# Patient Record
Sex: MALE | Race: WHITE | NOT HISPANIC OR LATINO | URBAN - METROPOLITAN AREA
[De-identification: names, ages, dates, MRNs, and addresses within clinical notes are randomized per-mention and may not be internally consistent; named-entity substitution may affect disease eponyms.]

---

## 2019-09-06 ENCOUNTER — EMERGENCY (EMERGENCY)
Facility: HOSPITAL | Age: 49
LOS: 1 days | Discharge: HOME | End: 2019-09-06
Attending: EMERGENCY MEDICINE | Admitting: EMERGENCY MEDICINE
Payer: COMMERCIAL

## 2019-09-06 VITALS
OXYGEN SATURATION: 100 % | HEART RATE: 89 BPM | RESPIRATION RATE: 16 BRPM | SYSTOLIC BLOOD PRESSURE: 158 MMHG | TEMPERATURE: 99 F | HEIGHT: 69 IN | WEIGHT: 182.98 LBS | DIASTOLIC BLOOD PRESSURE: 101 MMHG

## 2019-09-06 VITALS
RESPIRATION RATE: 16 BRPM | DIASTOLIC BLOOD PRESSURE: 96 MMHG | SYSTOLIC BLOOD PRESSURE: 140 MMHG | HEART RATE: 96 BPM | TEMPERATURE: 98 F | OXYGEN SATURATION: 97 %

## 2019-09-06 DIAGNOSIS — Z98.890 OTHER SPECIFIED POSTPROCEDURAL STATES: Chronic | ICD-10-CM

## 2019-09-06 DIAGNOSIS — F32.1 MAJOR DEPRESSIVE DISORDER, SINGLE EPISODE, MODERATE: ICD-10-CM

## 2019-09-06 DIAGNOSIS — F41.0 PANIC DISORDER [EPISODIC PAROXYSMAL ANXIETY]: ICD-10-CM

## 2019-09-06 LAB
ALBUMIN SERPL ELPH-MCNC: 4.4 G/DL — SIGNIFICANT CHANGE UP (ref 3.3–5)
ALP SERPL-CCNC: 72 U/L — SIGNIFICANT CHANGE UP (ref 30–120)
ALT FLD-CCNC: 83 U/L DA — HIGH (ref 10–60)
AMPHET UR-MCNC: NEGATIVE — SIGNIFICANT CHANGE UP
ANION GAP SERPL CALC-SCNC: 12 MMOL/L — SIGNIFICANT CHANGE UP (ref 5–17)
APAP SERPL-MCNC: <1 UG/ML — LOW (ref 10–30)
AST SERPL-CCNC: 39 U/L — SIGNIFICANT CHANGE UP (ref 10–40)
BARBITURATES UR SCN-MCNC: NEGATIVE — SIGNIFICANT CHANGE UP
BASOPHILS # BLD AUTO: 0.04 K/UL — SIGNIFICANT CHANGE UP (ref 0–0.2)
BASOPHILS NFR BLD AUTO: 0.3 % — SIGNIFICANT CHANGE UP (ref 0–2)
BENZODIAZ UR-MCNC: NEGATIVE — SIGNIFICANT CHANGE UP
BILIRUB SERPL-MCNC: 1.1 MG/DL — SIGNIFICANT CHANGE UP (ref 0.2–1.2)
BUN SERPL-MCNC: 14 MG/DL — SIGNIFICANT CHANGE UP (ref 7–23)
CALCIUM SERPL-MCNC: 10.3 MG/DL — SIGNIFICANT CHANGE UP (ref 8.4–10.5)
CHLORIDE SERPL-SCNC: 101 MMOL/L — SIGNIFICANT CHANGE UP (ref 96–108)
CO2 SERPL-SCNC: 23 MMOL/L — SIGNIFICANT CHANGE UP (ref 22–31)
COCAINE METAB.OTHER UR-MCNC: NEGATIVE — SIGNIFICANT CHANGE UP
CREAT SERPL-MCNC: 0.81 MG/DL — SIGNIFICANT CHANGE UP (ref 0.5–1.3)
EOSINOPHIL # BLD AUTO: 0.02 K/UL — SIGNIFICANT CHANGE UP (ref 0–0.5)
EOSINOPHIL NFR BLD AUTO: 0.2 % — SIGNIFICANT CHANGE UP (ref 0–6)
ETHANOL SERPL-MCNC: <3 MG/DL — SIGNIFICANT CHANGE UP (ref 0–3)
GLUCOSE SERPL-MCNC: 111 MG/DL — HIGH (ref 70–99)
HCT VFR BLD CALC: 45.1 % — SIGNIFICANT CHANGE UP (ref 39–50)
HGB BLD-MCNC: 16 G/DL — SIGNIFICANT CHANGE UP (ref 13–17)
IMM GRANULOCYTES NFR BLD AUTO: 0.3 % — SIGNIFICANT CHANGE UP (ref 0–1.5)
LYMPHOCYTES # BLD AUTO: 1.38 K/UL — SIGNIFICANT CHANGE UP (ref 1–3.3)
LYMPHOCYTES # BLD AUTO: 10.7 % — LOW (ref 13–44)
MCHC RBC-ENTMCNC: 30.5 PG — SIGNIFICANT CHANGE UP (ref 27–34)
MCHC RBC-ENTMCNC: 35.5 GM/DL — SIGNIFICANT CHANGE UP (ref 32–36)
MCV RBC AUTO: 85.9 FL — SIGNIFICANT CHANGE UP (ref 80–100)
METHADONE UR-MCNC: NEGATIVE — SIGNIFICANT CHANGE UP
MONOCYTES # BLD AUTO: 0.79 K/UL — SIGNIFICANT CHANGE UP (ref 0–0.9)
MONOCYTES NFR BLD AUTO: 6.1 % — SIGNIFICANT CHANGE UP (ref 2–14)
NEUTROPHILS # BLD AUTO: 10.61 K/UL — HIGH (ref 1.8–7.4)
NEUTROPHILS NFR BLD AUTO: 82.4 % — HIGH (ref 43–77)
NRBC # BLD: 0 /100 WBCS — SIGNIFICANT CHANGE UP (ref 0–0)
OPIATES UR-MCNC: NEGATIVE — SIGNIFICANT CHANGE UP
PCP SPEC-MCNC: SIGNIFICANT CHANGE UP
PCP UR-MCNC: NEGATIVE — SIGNIFICANT CHANGE UP
PLATELET # BLD AUTO: 450 K/UL — HIGH (ref 150–400)
POTASSIUM SERPL-MCNC: 3.8 MMOL/L — SIGNIFICANT CHANGE UP (ref 3.5–5.3)
POTASSIUM SERPL-SCNC: 3.8 MMOL/L — SIGNIFICANT CHANGE UP (ref 3.5–5.3)
PROT SERPL-MCNC: 8.3 G/DL — SIGNIFICANT CHANGE UP (ref 6–8.3)
RBC # BLD: 5.25 M/UL — SIGNIFICANT CHANGE UP (ref 4.2–5.8)
RBC # FLD: 12 % — SIGNIFICANT CHANGE UP (ref 10.3–14.5)
SALICYLATES SERPL-MCNC: <3 MG/DL — SIGNIFICANT CHANGE UP (ref 2.8–20)
SODIUM SERPL-SCNC: 136 MMOL/L — SIGNIFICANT CHANGE UP (ref 135–145)
THC UR QL: NEGATIVE — SIGNIFICANT CHANGE UP
WBC # BLD: 12.88 K/UL — HIGH (ref 3.8–10.5)
WBC # FLD AUTO: 12.88 K/UL — HIGH (ref 3.8–10.5)

## 2019-09-06 PROCEDURE — 99285 EMERGENCY DEPT VISIT HI MDM: CPT

## 2019-09-06 PROCEDURE — 71046 X-RAY EXAM CHEST 2 VIEWS: CPT | Mod: 26

## 2019-09-06 PROCEDURE — 99284 EMERGENCY DEPT VISIT MOD MDM: CPT | Mod: 25

## 2019-09-06 PROCEDURE — 36415 COLL VENOUS BLD VENIPUNCTURE: CPT

## 2019-09-06 PROCEDURE — 70470 CT HEAD/BRAIN W/O & W/DYE: CPT | Mod: 26

## 2019-09-06 PROCEDURE — 90792 PSYCH DIAG EVAL W/MED SRVCS: CPT | Mod: GT

## 2019-09-06 PROCEDURE — 85027 COMPLETE CBC AUTOMATED: CPT

## 2019-09-06 PROCEDURE — 80307 DRUG TEST PRSMV CHEM ANLYZR: CPT

## 2019-09-06 PROCEDURE — 70450 CT HEAD/BRAIN W/O DYE: CPT

## 2019-09-06 PROCEDURE — 70460 CT HEAD/BRAIN W/DYE: CPT

## 2019-09-06 PROCEDURE — 80053 COMPREHEN METABOLIC PANEL: CPT

## 2019-09-06 PROCEDURE — 71046 X-RAY EXAM CHEST 2 VIEWS: CPT

## 2019-09-06 NOTE — ED BEHAVIORAL HEALTH ASSESSMENT NOTE - DIFFERENTIAL
adjust ment d/o  Additional Suicide Risk Factors (select all that apply)  [  ]Access to lethal means including firearms  [  ]Family history of suicide  [  ]Impulsivity  [  ] Current or past mood disorder  [  ] Current or past psychotic disorder  [  ] Current or past PTSD  [  ] Current or past ADHD  [  ] Current or past TBI  [  ] Current or past cluster B personality disorder or traits  [  ] Current or past conduct problems  [  ] Recent onset of current or past psychiatric disorder  [  ] Family history of psychiatric diagnoses requiring hospitalization     Additional Activating Events (select all that apply)  [  ]Perceived burden on family or others  [  ]Current sexual or physical abuse  [  ]Substance intoxication or withdrawal  [  ]Inadequate social supports  [  ]Hopeless about or dissatisfied with current provider or treatment     Additional Protective Factors (select all that apply)  [ x ] Future plans  [  ] Shinto beliefs  [  ] Beloved pets  C-SSRS Screener     1. Have you ever wished to be dead or wished you could go to sleep and not wake up?  [  ]Yes, [ x ]No, [  ]Unable to Assess     2. Have you actually had any thoughts of killing yourself?   [  ]Yes, [ x ]No, [  ]Unable to Assess     If answer is “No” for 1 and 2, stop here. If answer is “Yes” to 1 or 2, proceed to 3.     3. Have you been thinking about how you might kill yourself?  [  ]Yes, [  ]No, [  ]Unable to Assess     4. Have you had these thoughts and had some intention of acting on them?  [  ]Yes, [  ]No, [  ]Unable to Assess     5. Have you started to work out or worked out the details of how to kill yourself? Do you intend to carry out this plan?  [  ]Yes, [  ]No, [  ]Unable to Assess     6. Have you ever done anything, started to do anything, or prepared to do anything to end your life? If so, was it in the past 3 months?  [  ]Yes, [  ]No, [  ]Unable to Assess    Details:_________________________________

## 2019-09-06 NOTE — ED BEHAVIORAL HEALTH ASSESSMENT NOTE - OTHER PAST PSYCHIATRIC HISTORY (INCLUDE DETAILS REGARDING ONSET, COURSE OF ILLNESS, INPATIENT/OUTPATIENT TREATMENT)
very briefly saw a psychiatrist earlier this year when lost his job. took lexapro for two days at that time. became noncompliant. no other psych hx

## 2019-09-06 NOTE — ED PROVIDER NOTE - CLINICAL SUMMARY MEDICAL DECISION MAKING FREE TEXT BOX
panic attack associated with neurologic symptoms sent by pmd for eval of brain and for telepsy consultation  labs ro metabolic anemia tox ct brain telepsy dispo

## 2019-09-06 NOTE — ED BEHAVIORAL HEALTH ASSESSMENT NOTE - SUMMARY
48 y o  male, , domiciled with wife and two 8 y o twin daughters, employed in advertising sales, hx of anxiety, medical hx of hypercholesterolemia, hx of social alcohol use, no prior psych hospitalizations/ suicide attempts/aggression/legal hx; bib self, referred by PMD Dr. Yanez, for anxiety and panic attack.  on evaluation, pt appears stable and improved. he is neurologically and medically cleared. his symptoms of a panic attack earlier today are now attenuated. he denies si/hi, there is no jessica or psychosis. while pt has been depressed with panic attacks over the past year since changing jobs, he is not now suicidal or an acute danger in any way. wife has no safety concerns. pt should return to the care of his outpatient psychiatrist for continued medication management.

## 2019-09-06 NOTE — ED PROVIDER NOTE - NSFOLLOWUPCLINICS_GEN_ALL_ED_FT
[facility]  Psychiatry  [address] [place] [address]  Phone: [phone]  Fax:   Follow Up Time:     [facility]  Psychiatry  [address] [place] [address]  Phone: [phone]  Fax:   Follow Up Time:     [facility]  Psychiatry  [address] [place] [address]  Phone: [phone]  Fax:   Follow Up Time:     [facility]  Psychiatry  [address] [place] [address]  Phone: [phone]  Fax:   Follow Up Time:     [facility]  Psychiatry  [address] [place] [address]  Phone: [phone]  Fax:   Follow Up Time:     [facility]  Psychiatry  [address] [place] [address]  Phone: [phone]  Fax:   Follow Up Time:     [facility]  Psychiatry  [address] [place] [address]  Phone: [phone]  Fax:   Follow Up Time:     [facility]  Psychiatry  [address]  [place] [address]  Phone: [phone]  Fax:   Follow Up Time:     [facility]  Psychiatry  [address] [place] [address]  Phone: [phone]  Fax:   Follow Up Time:

## 2019-09-06 NOTE — ED PROVIDER NOTE - OBJECTIVE STATEMENT
pt is a 47 yo m who has a one year hx of intermittent anxiety and depression. he finally sought counselling/therapy and saw psychiatrist yesterday- started on duloxetine and hydroxyzine.  Last kika he was unable to sleep and was panicked again so he went to see his pmd dr Yury Mcclure.  in dr Mcclure's office (per wife) pt had one of his full blown panic attacks.  so dr mcclure sent him to er for ct of his head and psychiatry evaluation.    pt denies si hi he is aware of activity and would not provide any history until his wife was at the bedside to answer most questions.  pt is not a smker but is a weekend drinker (CAGE 2/4-counselled), smokes pot iwht remote hx of cocaine use (5 yrs ago). pmhx of elev chol, sp hernia repair.  denies dv.  wife states he switched jobs a year ago when the onset of depression started.

## 2019-09-06 NOTE — ED BEHAVIORAL HEALTH ASSESSMENT NOTE - REFERRAL / APPOINTMENT DETAILS
return to dr cm torrez for med mgt. also, btc faxed to [facility] resources and info on outpatient services in pt's catchment area

## 2019-09-06 NOTE — ED PROVIDER NOTE - PROGRESS NOTE DETAILS
dw dr linnette mcclure: was perseverating repeating same question over and over with concern for stroke or brain tumor. recc telepsych.as well because pt unable to reach primary psych and his behavior is new. dr campbell telepsych- dr campbell telepsych- will see after ct  12:44, pt will be seen by psy he is cleared medically by me for this evaluation no acute evidence of medical issue Anuj Mahajan not likely acute issues, will fax over resources, panic disorder ok for dc.

## 2019-09-06 NOTE — ED BEHAVIORAL HEALTH ASSESSMENT NOTE - DESCRIPTION
calm and cooperative. normal head ct, neurologically and medically cleared     Vital Signs Last 24 Hrs  T(C): 36.6 (06 Sep 2019 13:30), Max: 37.1 (06 Sep 2019 09:43)  T(F): 97.8 (06 Sep 2019 13:30), Max: 98.7 (06 Sep 2019 09:43)  HR: 96 (06 Sep 2019 13:30) (88 - 96)  BP: 140/96 (06 Sep 2019 13:30) (140/96 - 158/101)  BP(mean): --  RR: 16 (06 Sep 2019 13:30) (16 - 16)  SpO2: 97% (06 Sep 2019 13:30) (97% - 100%) hypercholosterolemia see hpi

## 2019-09-06 NOTE — ED PROVIDER NOTE - PATIENT PORTAL LINK FT
You can access the FollowMyHealth Patient Portal offered by [facility] by registering at the following website: [email or web address]. By joining [facility]’s FollowMyHealth portal, you will also be able to view your health information using other applications (apps) compatible with our system.

## 2019-09-06 NOTE — ED BEHAVIORAL HEALTH ASSESSMENT NOTE - RISK ASSESSMENT
Acute Suicide Risk  (  ) High   (  ) Moderate   ( x ) Low   (  ) Unable to determine   Rationale _______denies suicidal ideation; future oriented ____    Elevated Chronic Risk   (  ) Yes ___________  Details ___________  (x ) No   __________no hx of suicide attempts or prior psych hx_  Details: ___________  [ x ] Safety plan discussed with patient  [ x ] Education provided regarding environmental safety / lethal means restriction  [  ] Provision of National Suicide Prevention Lifeline 5-940-531-TALK (5298)

## 2019-09-06 NOTE — ED PROVIDER NOTE - CARE PROVIDER_API CALL
Moiz Yanez)  Internal Medicine  [address] [place] [address]  Phone: [phone]  Fax: [phone]  Follow Up Time:

## 2019-09-06 NOTE — ED ADULT NURSE NOTE - OBJECTIVE STATEMENT
Pt stated he has increasing anxiety, depression,  unable to focus, " fainted this am." Recently started on depression meds.

## 2019-09-06 NOTE — ED BEHAVIORAL HEALTH ASSESSMENT NOTE - HPI (INCLUDE ILLNESS QUALITY, SEVERITY, DURATION, TIMING, CONTEXT, MODIFYING FACTORS, ASSOCIATED SIGNS AND SYMPTOMS)
48 y o  male, , domiciled with wife and two 8 y o twin daughters, employed in advertising sales, hx of anxiety, medical hx of hypercholesterolemia, hx of social alcohol use, no prior psych hospitalizations/ suicide attempts/aggression/legal hx; bib self, referred by PMD Dr. Yanez, for anxiety and panic attack.  Per Dr. Adair who spoke with Dr. Yanez, pt had a "full blown panic attack" in doctor's office today, was somewhat faint, had difficulty speaking for a brief period of time. pt sent to ED for psych evaluation as well as to rule out a neurological illness (head ct normal, no neuro findings in ED). pt seen , he is calm and cooperative, verbal, pleasant. states he has been depressed and anxioius since losing his job earlier this year, has had to take a diffferent , lower paying job. he has had depressed mood, poor sleep, decreased appetite and energy, and several panic attacks a week. he describes what happened earlier today as a particularly bad panic attack. he started treatment with a psychiatrist, dr cm torrez, yesterday, who prescribed duloxetine and prn hydroxyzine. he also started seeing a therapist leticia glaser. he states he took the medications last night and slept particularly poorly and had a nightmare. he felt not himself from when he woke up today. he feels better now and is future oriented. he denies current, or recent, or any lifetime, suicidal ideation intent or plan. denies manic/psychotic sx's, denies homicidal ideation. drinks socially. has used MJ a few times recently to sleep, none for ten days. he is future oriented. has concerns and many questions about medications and their side effects.   Spoke with pt's wife, she believes pt had panic attack today, she does not have concerns of pt being suicidal or a danger in any way, supports his discharge and outpatient f/u

## 2019-09-06 NOTE — ED ADULT NURSE NOTE - NSIMPLEMENTINTERV_GEN_ALL_ED
Implemented All Universal Safety Interventions:  [place] to call system. Call bell, personal items and telephone within reach. Instruct patient to call for assistance. Room bathroom lighting operational. Non-slip footwear when patient is off stretcher. Physically safe environment: no spills, clutter or unnecessary equipment. Stretcher in lowest position, wheels locked, appropriate side rails in place.

## 2019-09-06 NOTE — ED PROVIDER NOTE - NSFOLLOWUPINSTRUCTIONS_ED_ALL_ED_FT
Anxiety    Generalized anxiety disorder (TAYLER) is a mental disorder. It is defined as anxiety that is not necessarily related to specific events or activities or is out of proportion to said events. Symptoms include restlessness, fatigue, difficulty concentrations, irritability and difficulty concentrating. It may interfere with life functions, including relationships, work, and school. If you were started on a medication, make sure to take exactly as prescribed and follow up with a psychiatrist.    SEEK IMMEDIATE MEDICAL CARE IF YOU HAVE ANY OF THE FOLLOWING SYMPTOMS: thoughts about hurting killing yourself, thoughts about hurting or killing somebody else, hallucinations, or worsening depression.    FOLLOW UP WITH YOUR PSYCHIATRIST OR WITH ANY OF THE RESOURCES PROVIDED   MEDS AS PRESCRIBED  NO ALCOHOL OR SEDATIVES NO MARIJUANA    FOLLOW UP WITH YOUR PRIMARY CARE DOCTOR  RETURN FOR ANY CONCERNS

## 2019-09-09 RX ORDER — ROSUVASTATIN CALCIUM 5 MG/1
1 TABLET ORAL
Qty: 0 | Refills: 0 | DISCHARGE

## 2019-09-09 RX ORDER — HYDROXYZINE HCL 10 MG
0 TABLET ORAL
Qty: 0 | Refills: 0 | DISCHARGE

## 2019-09-09 RX ORDER — DULOXETINE HYDROCHLORIDE 30 MG/1
0 CAPSULE, DELAYED RELEASE ORAL
Qty: 0 | Refills: 0 | DISCHARGE

## 2020-03-03 NOTE — ED ADULT NURSE NOTE - NS ED NURSE LEVEL OF CONSCIOUSNESS ORIENTATION
Information below was sent to patient via [facility] in a separate encounter. closing   Oriented - self; Oriented - place; Oriented - time
